# Patient Record
Sex: FEMALE | Race: WHITE
[De-identification: names, ages, dates, MRNs, and addresses within clinical notes are randomized per-mention and may not be internally consistent; named-entity substitution may affect disease eponyms.]

---

## 2018-11-12 ENCOUNTER — HOSPITAL ENCOUNTER (OUTPATIENT)
Dept: HOSPITAL 92 - BICRAD | Age: 43
Discharge: HOME | End: 2018-11-12
Attending: CHIROPRACTOR
Payer: COMMERCIAL

## 2018-11-12 DIAGNOSIS — M53.86: ICD-10-CM

## 2018-11-12 DIAGNOSIS — M79.10: ICD-10-CM

## 2018-11-12 DIAGNOSIS — M53.84: ICD-10-CM

## 2018-11-12 DIAGNOSIS — M54.5: Primary | ICD-10-CM

## 2018-11-12 PROCEDURE — 72100 X-RAY EXAM L-S SPINE 2/3 VWS: CPT

## 2018-11-12 NOTE — RAD
TWO VIEWS LUMBOSACRAL SPINE:

 

Comparison: None. 

 

History: Low back pain, hurt back two weeks ago. 

 

FINDINGS: 

Two views of the lumbosacral spine shows normal height and alignment of the vertebral bodies and inte
rvertebral discs without fracture or subluxation. No degenerative changes are seen. 

 

IMPRESSION: 

Unremarkable exam. 

 

POS: [facility]